# Patient Record
Sex: FEMALE | URBAN - METROPOLITAN AREA
[De-identification: names, ages, dates, MRNs, and addresses within clinical notes are randomized per-mention and may not be internally consistent; named-entity substitution may affect disease eponyms.]

---

## 2023-03-20 ENCOUNTER — APPOINTMENT (RX ONLY)
Dept: URBAN - METROPOLITAN AREA CLINIC 141 | Facility: CLINIC | Age: 63
Setting detail: DERMATOLOGY
End: 2023-03-20

## 2023-03-20 DIAGNOSIS — Z41.9 ENCOUNTER FOR PROCEDURE FOR PURPOSES OTHER THAN REMEDYING HEALTH STATE, UNSPECIFIED: ICD-10-CM

## 2023-03-20 PROCEDURE — ? DERMAPLANE

## 2023-03-20 PROCEDURE — ? FACIAL

## 2023-03-20 PROCEDURE — ? INVENTORY

## 2023-03-20 ASSESSMENT — LOCATION DETAILED DESCRIPTION DERM
LOCATION DETAILED: LEFT INFERIOR CENTRAL MALAR CHEEK
LOCATION DETAILED: LEFT CHIN
LOCATION DETAILED: NASAL DORSUM
LOCATION DETAILED: LEFT MEDIAL FOREHEAD
LOCATION DETAILED: LEFT INFERIOR MEDIAL MALAR CHEEK
LOCATION DETAILED: NASAL SUPRATIP
LOCATION DETAILED: LEFT INFERIOR MEDIAL FOREHEAD
LOCATION DETAILED: RIGHT INFERIOR CENTRAL MALAR CHEEK
LOCATION DETAILED: RIGHT LOWER CUTANEOUS LIP

## 2023-03-20 ASSESSMENT — LOCATION ZONE DERM
LOCATION ZONE: FACE
LOCATION ZONE: LIP
LOCATION ZONE: NOSE

## 2023-03-20 ASSESSMENT — LOCATION SIMPLE DESCRIPTION DERM
LOCATION SIMPLE: NOSE
LOCATION SIMPLE: LEFT CHEEK
LOCATION SIMPLE: RIGHT CHEEK
LOCATION SIMPLE: RIGHT LIP
LOCATION SIMPLE: LEFT FOREHEAD
LOCATION SIMPLE: CHIN

## 2023-03-20 NOTE — HPI: OTHER
Condition:: Aging skin hyperpigmentation and Sun damage.
Please Describe Your Condition:: Interested in double exfoliation. Enzyme facial with a dermaplane to remove excess vellus hair. Discussed chemical peels in the future.

## 2023-03-20 NOTE — PROCEDURE: FACIAL
Exfoliation Type: enzyme
Treatment Type (Optional): Deep Cleansing Facial
Exfoliation Type Override: yuzu citrus enzyme
Mask Type (Optional): soft based
Treatment Serum Override: goji berry yogurt mask
Facial Steaming: steamed
Detail Level: Zone
Extraction Method: extractor

## 2023-03-20 NOTE — PROCEDURE: DERMAPLANE
Treatment Areas: face
Post-Procedure Instructions: Following the dermaplane procedure, Oxymist treatment was applied to the treatment areas. Moisturizer and SPF was applied.
Pre-Procedure Text: The patient was placed in a recumbant position on the procedure table.
Post-Care Instructions: I reviewed with the patient in detail post-care instructions.
Detail Level: Zone
Blade: 10R blade scalpel
Treatment Area Prep: alcohol

## 2024-09-09 ENCOUNTER — APPOINTMENT (RX ONLY)
Dept: URBAN - METROPOLITAN AREA CLINIC 141 | Facility: CLINIC | Age: 64
Setting detail: DERMATOLOGY
End: 2024-09-09

## 2024-09-09 DIAGNOSIS — Z41.9 ENCOUNTER FOR PROCEDURE FOR PURPOSES OTHER THAN REMEDYING HEALTH STATE, UNSPECIFIED: ICD-10-CM

## 2024-09-09 PROCEDURE — ? DERMAPLANE

## 2024-09-09 PROCEDURE — ? INVENTORY

## 2024-09-09 PROCEDURE — ? DIAMONDGLOW

## 2024-09-09 ASSESSMENT — LOCATION SIMPLE DESCRIPTION DERM
LOCATION SIMPLE: INFERIOR FOREHEAD
LOCATION SIMPLE: RIGHT FOREHEAD
LOCATION SIMPLE: LEFT FOREHEAD

## 2024-09-09 ASSESSMENT — LOCATION ZONE DERM: LOCATION ZONE: FACE

## 2024-09-09 ASSESSMENT — LOCATION DETAILED DESCRIPTION DERM
LOCATION DETAILED: RIGHT INFERIOR MEDIAL FOREHEAD
LOCATION DETAILED: LEFT INFERIOR MEDIAL FOREHEAD
LOCATION DETAILED: INFERIOR MID FOREHEAD

## 2024-09-09 NOTE — PROCEDURE: DIAMONDGLOW
Facial Treatment Head Used?: Not Used
Detail Level: Zone
Vacuum Pressure In Inches: 10
Intelliflow Setting: 100%
Solution Used: Ultra Hydrating Serum
Post-Care Instructions: I reviewed with the patient in detail post-care instructions. Patient should stay away from the sun and wear sun protection until treated areas are fully healed.
Consent: Written consent obtained, risks reviewed including but not limited to crusting, scabbing, blistering, scarring, darker or lighter pigmentary change, bruising, and/or incomplete response.
Number Of Passes: 4

## 2024-09-09 NOTE — PROCEDURE: DERMAPLANE
Post-Care Instructions: Verbally reviewed with the patient in detail post-care instructions. Patient was also sent home with detailed post care instructions
Treatment Area Prep Override: full face
Treatment Areas: face
Pre-Procedure Text: The patient was placed in a recumbant position on the procedure table. Patients face was cleansed and all oils were removed with an alcohol swab.
Blade: 10 blade scalpel
Detail Level: Zone
Post-Procedure Instructions: Following the dermaplane procedure, Avene thermal water mist was applied to the treatment areas. Avene gentle moisturizer and SPF 50 was applied to patients face.

## 2024-09-09 NOTE — HPI: OTHER
Condition:: Vellus hair on face and clogged pores
Please Describe Your Condition:: Patient is interested in a dermaplane treatment and Trinity glow treatment.